# Patient Record
Sex: FEMALE | Race: BLACK OR AFRICAN AMERICAN | NOT HISPANIC OR LATINO
[De-identification: names, ages, dates, MRNs, and addresses within clinical notes are randomized per-mention and may not be internally consistent; named-entity substitution may affect disease eponyms.]

---

## 2023-04-20 ENCOUNTER — NON-APPOINTMENT (OUTPATIENT)
Age: 78
End: 2023-04-20

## 2023-04-20 ENCOUNTER — APPOINTMENT (OUTPATIENT)
Dept: OPHTHALMOLOGY | Facility: CLINIC | Age: 78
End: 2023-04-20
Payer: MEDICARE

## 2023-04-20 PROCEDURE — 92004 COMPRE OPH EXAM NEW PT 1/>: CPT

## 2023-04-20 PROCEDURE — 92015 DETERMINE REFRACTIVE STATE: CPT

## 2023-04-20 PROCEDURE — 92020 GONIOSCOPY: CPT

## 2023-05-09 ENCOUNTER — NON-APPOINTMENT (OUTPATIENT)
Age: 78
End: 2023-05-09

## 2023-05-09 ENCOUNTER — APPOINTMENT (OUTPATIENT)
Dept: OPHTHALMOLOGY | Facility: CLINIC | Age: 78
End: 2023-05-09
Payer: MEDICARE

## 2023-05-09 PROCEDURE — 99214 OFFICE O/P EST MOD 30 MIN: CPT

## 2023-05-09 PROCEDURE — 76514 ECHO EXAM OF EYE THICKNESS: CPT

## 2023-05-09 PROCEDURE — 92020 GONIOSCOPY: CPT

## 2023-05-09 PROCEDURE — 76513 OPH US DX ANT SGM US UNI/BI: CPT | Mod: 50

## 2023-06-09 ENCOUNTER — APPOINTMENT (OUTPATIENT)
Dept: OPHTHALMOLOGY | Facility: CLINIC | Age: 78
End: 2023-06-09
Payer: MEDICARE

## 2023-06-09 ENCOUNTER — NON-APPOINTMENT (OUTPATIENT)
Age: 78
End: 2023-06-09

## 2023-06-09 PROCEDURE — 66761 REVISION OF IRIS: CPT | Mod: RT

## 2023-07-10 ENCOUNTER — NON-APPOINTMENT (OUTPATIENT)
Age: 78
End: 2023-07-10

## 2023-07-10 ENCOUNTER — APPOINTMENT (OUTPATIENT)
Dept: OPHTHALMOLOGY | Facility: CLINIC | Age: 78
End: 2023-07-10
Payer: MEDICARE

## 2023-07-10 PROBLEM — Z00.00 ENCOUNTER FOR PREVENTIVE HEALTH EXAMINATION: Status: ACTIVE | Noted: 2023-07-10

## 2023-07-10 PROCEDURE — 66761 REVISION OF IRIS: CPT | Mod: LT

## 2023-08-09 ENCOUNTER — NON-APPOINTMENT (OUTPATIENT)
Age: 78
End: 2023-08-09

## 2023-08-09 ENCOUNTER — APPOINTMENT (OUTPATIENT)
Dept: OPHTHALMOLOGY | Facility: CLINIC | Age: 78
End: 2023-08-09
Payer: MEDICARE

## 2023-08-09 PROCEDURE — 92012 INTRM OPH EXAM EST PATIENT: CPT

## 2023-08-10 ENCOUNTER — TRANSCRIPTION ENCOUNTER (OUTPATIENT)
Age: 78
End: 2023-08-10

## 2023-09-27 ENCOUNTER — NON-APPOINTMENT (OUTPATIENT)
Age: 78
End: 2023-09-27

## 2023-09-27 ENCOUNTER — APPOINTMENT (OUTPATIENT)
Dept: OPHTHALMOLOGY | Facility: CLINIC | Age: 78
End: 2023-09-27
Payer: MEDICARE

## 2023-09-27 PROCEDURE — 92014 COMPRE OPH EXAM EST PT 1/>: CPT

## 2023-09-27 PROCEDURE — 92020 GONIOSCOPY: CPT

## 2024-05-22 ENCOUNTER — APPOINTMENT (OUTPATIENT)
Dept: ORTHOPEDIC SURGERY | Facility: CLINIC | Age: 79
End: 2024-05-22

## 2024-05-24 ENCOUNTER — EMERGENCY (EMERGENCY)
Facility: HOSPITAL | Age: 79
LOS: 1 days | Discharge: HOME | End: 2024-05-24
Attending: EMERGENCY MEDICINE | Admitting: EMERGENCY MEDICINE
Payer: MEDICARE

## 2024-05-24 VITALS
SYSTOLIC BLOOD PRESSURE: 105 MMHG | RESPIRATION RATE: 16 BRPM | HEART RATE: 71 BPM | DIASTOLIC BLOOD PRESSURE: 72 MMHG | OXYGEN SATURATION: 100 % | TEMPERATURE: 98 F

## 2024-05-24 VITALS
OXYGEN SATURATION: 97 % | WEIGHT: 158.07 LBS | HEART RATE: 99 BPM | TEMPERATURE: 97 F | SYSTOLIC BLOOD PRESSURE: 129 MMHG | RESPIRATION RATE: 18 BRPM | DIASTOLIC BLOOD PRESSURE: 88 MMHG

## 2024-05-24 PROCEDURE — 99284 EMERGENCY DEPT VISIT MOD MDM: CPT

## 2024-05-24 RX ORDER — DIAZEPAM 5 MG
1 TABLET ORAL
Qty: 12 | Refills: 0
Start: 2024-05-24 | End: 2024-05-26

## 2024-05-24 RX ORDER — DIAZEPAM 5 MG
5 TABLET ORAL ONCE
Refills: 0 | Status: DISCONTINUED | OUTPATIENT
Start: 2024-05-24 | End: 2024-05-24

## 2024-05-24 RX ORDER — LIDOCAINE 4 G/100G
1 CREAM TOPICAL ONCE
Refills: 0 | Status: COMPLETED | OUTPATIENT
Start: 2024-05-24 | End: 2024-05-24

## 2024-05-24 RX ORDER — KETOROLAC TROMETHAMINE 30 MG/ML
15 SYRINGE (ML) INJECTION ONCE
Refills: 0 | Status: DISCONTINUED | OUTPATIENT
Start: 2024-05-24 | End: 2024-05-24

## 2024-05-24 RX ADMIN — LIDOCAINE 1 PATCH: 4 CREAM TOPICAL at 10:09

## 2024-05-24 RX ADMIN — Medication 15 MILLIGRAM(S): at 10:09

## 2024-05-24 RX ADMIN — Medication 5 MILLIGRAM(S): at 10:09

## 2024-05-24 NOTE — ED ADULT NURSE NOTE - NSICDXPASTSURGICALHX_GEN_ALL_CORE_FT
PAST SURGICAL HISTORY:  Breast Cyst benign cyst removal, unsure either left or right as per patient

## 2024-05-24 NOTE — ED PROVIDER NOTE - PATIENT PORTAL LINK FT
You can access the FollowMyHealth Patient Portal offered by [facility] by registering at the following website: [email or web address]. By joining [facility]’s FollowMyHealth portal, you will also be able to view your health information using other applications (apps) compatible with our system.

## 2024-05-24 NOTE — ED PROVIDER NOTE - NEUROLOGICAL, MLM
Alert and oriented, no focal deficits, no motor or sensory deficits. Alert and oriented, no focal deficits,+r Straight leg sign

## 2024-05-24 NOTE — ED PROVIDER NOTE - NSFOLLOWUPINSTRUCTIONS_ED_ALL_ED_FT
Sciatica  Back view of a person showing a normal leg and a leg affected by the pain of sciatica.  Sciatica is pain, numbness, weakness, or tingling along the path of the sciatic nerve. The sciatic nerve starts in the lower back and runs down the back of each leg. The nerve controls the muscles in the lower leg and in the back of the knee. It also provides feeling (sensation) to the back of the thigh, the lower leg, and the sole of the foot. Sciatica is a symptom of another medical condition that pinches or puts pressure on the sciatic nerve.    Sciatica most often only affects one side of the body. Sciatica usually goes away on its own or with treatment. In some cases, sciatica may come back (recur).    What are the causes?  This condition is caused by pressure on the sciatic nerve or pinching of the nerve. This may be the result of:  A disk in between the bones of the spine bulging out too far (herniated disk).  Age-related changes in the spinal disks.  A pain disorder that affects a muscle in the buttock.  Extra bone growth near the sciatic nerve.  A break (fracture) of the pelvis.  Pregnancy.  Tumor. This is rare.  What increases the risk?  The following factors may make you more likely to develop this condition:  Playing sports that place pressure or stress on the spine.  Having poor strength and flexibility.  A history of back injury or surgery.  Sitting for long periods of time.  Doing activities that involve repetitive bending or lifting.  Obesity.  What are the signs or symptoms?  Symptoms can vary from mild to very severe. They may include:  Any of the following problems in the lower back, leg, hip, or buttock:  Mild tingling, numbness, or dull aches.  Burning sensations.  Sharp pains.  Numbness in the back of the calf or the sole of the foot.  Leg weakness.  Severe back pain that makes movement difficult.  Symptoms may get worse when you cough, sneeze, or laugh, or when you sit or stand for long periods of time.    How is this diagnosed?  This condition may be diagnosed based on:  Your symptoms and medical history.  A physical exam.  Blood tests.  Imaging tests, such as:  X-rays.  An MRI.  A CT scan.  How is this treated?  In many cases, this condition improves on its own without treatment. However, treatment may include:  Reducing or modifying physical activity.  Exercising, including strengthening and stretching.  Icing and applying heat to the affected area.  Medicines that help to:  Relieve pain and swelling.  Relax your muscles.  Injections of medicines that help to relieve pain and inflammation (steroids) around the sciatic nerve.  Surgery.  Follow these instructions at home:  Medicines    Take over-the-counter and prescription medicines only as told by your health care provider.  Ask your health care provider if the medicine prescribed to you requires you to avoid driving or using heavy machinery.  Managing pain    Bag of ice on a towel on the skin.  A heating pad for use on the affected area.  If directed, put ice on the affected area. To do this:  Put ice in a plastic bag.  Place a towel between your skin and the bag.  Leave the ice on for 20 minutes, 2–3 times a day.  If your skin turns bright red, remove the ice right away to prevent skin damage. The risk of skin damage is higher if you cannot feel pain, heat, or cold.  If directed, apply heat to the affected area as often as told by your health care provider. Use the heat source that your health care provider recommends, such as a moist heat pack or a heating pad.  Place a towel between your skin and the heat source.  Leave the heat on for 20–30 minutes.  If your skin turns bright red, remove the heat right away to prevent burns. The risk of burns is higher if you cannot feel pain, heat, or cold.  Activity    A comparison showing the right and wrong way to lift a heavy object.  Return to your normal activities as told by your health care provider. Ask your health care provider what activities are safe for you.  Avoid activities that make your symptoms worse.  Take brief periods of rest throughout the day.  When you rest for longer periods, mix in some mild activity or stretching between periods of rest. This will help to prevent stiffness and pain.  Avoid sitting for long periods of time without moving. Get up and move around at least one time each hour.  Exercise and stretch regularly as told by your health care provider.  Do not lift anything that is heavier than 10 lb (4.5 kg) until your health care provider says that it is safe. When you do not have symptoms, you should still avoid heavy lifting, especially repetitive heavy lifting.  When you lift objects, always use proper lifting technique, which includes:  Bending your knees.  Keeping the load close to your body.  Avoiding twisting.  General instructions    Maintain a healthy weight. Excess weight puts extra stress on your back.  Wear supportive, comfortable shoes. Avoid wearing high heels.  Avoid sleeping on a mattress that is too soft or too hard. A mattress that is firm enough to support your back when you sleep may help to reduce your pain.  Contact a health care provider if:  Your pain is not controlled by medicine.  Your pain does not improve or gets worse.  Your pain lasts longer than 4 weeks.  You have unexplained weight loss.  Get help right away if:  You are not able to control when you urinate or have bowel movements (incontinence).  You have:  Weakness in your lower back, pelvis, buttocks, or legs that gets worse.  Redness or swelling of your back.  A burning sensation when you urinate.  Summary  Sciatica is pain, numbness, weakness, or tingling along the path of the sciatic nerve, which may include the lower back, legs, hips, and buttocks.  This condition is caused by pressure on the sciatic nerve or pinching of the nerve.  Treatment often includes rest, exercise, medicines, and applying ice or heat.

## 2024-05-24 NOTE — ED ADULT NURSE NOTE - OBJECTIVE STATEMENT
A&Ox4. ambulatory. c/o  right sided back pain radiates down leg x 1 week. NAD. pt denies SOB, chest pain, dizziness, weakness, urinary symptoms, HA, n/v/d, fevers, chills, trauma, loss of bowels. respirations are even and un labored. skin intact. medicated as ordered. see EMAR.

## 2024-05-24 NOTE — ED ADULT TRIAGE NOTE - CHIEF COMPLAINT QUOTE
ambulatory to triage, c/o right sided back pain radiates down leg x 1 week, history of HTN Spontaneous, unlabored and symmetrical

## 2024-05-24 NOTE — ED PROVIDER NOTE - OBJECTIVE STATEMENT
78-year-old female history of hypertension presents emergency room with right mid gluteal pain rating down towards right posterior gluteal. 78-year-old female history of hypertension presents emergency room with right mid gluteal pain rating down towards right posterior gluteal. no urinary incontinence, no fecal incontinence.

## 2024-05-28 ENCOUNTER — APPOINTMENT (OUTPATIENT)
Age: 79
End: 2024-05-28
Payer: MEDICARE

## 2024-05-28 VITALS
HEART RATE: 93 BPM | SYSTOLIC BLOOD PRESSURE: 130 MMHG | WEIGHT: 158 LBS | BODY MASS INDEX: 25.39 KG/M2 | HEIGHT: 66 IN | DIASTOLIC BLOOD PRESSURE: 77 MMHG

## 2024-05-28 PROCEDURE — 72100 X-RAY EXAM L-S SPINE 2/3 VWS: CPT

## 2024-05-28 PROCEDURE — 99204 OFFICE O/P NEW MOD 45 MIN: CPT

## 2024-05-28 RX ORDER — DICLOFENAC SODIUM 75 MG/1
75 TABLET, DELAYED RELEASE ORAL
Qty: 60 | Refills: 0 | Status: ACTIVE | COMMUNITY
Start: 2024-05-28 | End: 1900-01-01

## 2024-05-28 NOTE — DISCUSSION/SUMMARY
[Medication Risks Reviewed] : Medication risks reviewed [de-identified] : She will rest and use moist heat.  She has been started on diclofenac 75 mg twice a day as a nonsteroidal anti-inflammatory.  She will call if there are problems with the medication or worsening of her symptoms and I will see her for follow-up in 3 weeks.

## 2024-05-28 NOTE — REASON FOR VISIT
[Initial Visit] : an initial visit for [Degenerative Joint Disease] : degenerative joint disease [Radiculopathy] : radiculopathy [Spouse] : spouse

## 2024-05-28 NOTE — PHYSICAL EXAM
[de-identified] : She is fully alert and oriented with a normal mood and affect.  She is uncomfortable sitting and prefers to be lying on her stomach.  She ambulates with a normal gait including tiptoe and heel walking.  There are no antalgic or coxalgia components to her gait.  There are no cutaneous abnormalities or palpable bony defects of the spine.  There is no evidence of shortness of breath or respiratory distress.  There is no paravertebral muscle spasm.  Sidebending is extremely good for her age.  There is no sciatic or trochanteric tenderness.  Her lower extremity neurological examination revealed trace symmetrical knee jerks with reinforcement.  Ankle jerks are absent.  Motor power is normal to manual testing all lower extremity groups and sensation is normal to light touch in all dermatomes.  Straight leg raising is negative to 90 degrees in the sitting position bilaterally.  Her hips and her knees have a full and painless range of motion with normal stability.  Vascular examination shows no evidence of significant varicosities and there is no lymphedema.  There are no cutaneous abnormalities of the upper or the lower extremities. [de-identified] : AP and lateral x-rays of the lumbar spine are obtained.  There are changes of facet arthrosis in the lower lumbar spine.  There is a very minimal degenerative spondylolisthesis at L4-5.  There are no destructive changes.

## 2024-05-28 NOTE — HISTORY OF PRESENT ILLNESS
[de-identified] : This 78-year-old woman who denies any prior history of spine related symptoms is seen in the office today along with her .  2 months ago she began with some discomfort in the right buttock.  At that point she was doing yoga and Pilates.  2 weeks ago she developed pain in the right buttock and lateral thigh extending to the calf and foot.  She is not having back pain or left leg pain.  She is having some numbness and tingling in the right leg but denies weakness.  There is no Valsalva effect.  She gets occasional night pain.  The pain is worse sitting and to a slightly lesser extent worse standing but no worse walking.  Treatment to date has been Tylenol, Valium and a muscle relaxant was prescribed.  She is in excellent health and only on amlodipine for hypertension.  She is a former smoker. [Pain Location] : pain [Worsening] : worsening [9] : a maximum pain level of 9/10 [Sitting] : sitting [Standing] : standing

## 2024-06-18 ENCOUNTER — APPOINTMENT (OUTPATIENT)
Dept: ORTHOPEDIC SURGERY | Facility: CLINIC | Age: 79
End: 2024-06-18
Payer: MEDICARE

## 2024-06-18 VITALS — WEIGHT: 158 LBS | BODY MASS INDEX: 25.39 KG/M2 | HEIGHT: 66 IN

## 2024-06-18 DIAGNOSIS — M47.816 SPONDYLOSIS W/OUT MYELOPATHY OR RADICULOPATHY, LUMBAR REGION: ICD-10-CM

## 2024-06-18 DIAGNOSIS — M54.16 RADICULOPATHY, LUMBAR REGION: ICD-10-CM

## 2024-06-18 PROCEDURE — 99213 OFFICE O/P EST LOW 20 MIN: CPT

## 2024-06-18 RX ORDER — DICLOFENAC SODIUM 50 MG/1
50 TABLET, DELAYED RELEASE ORAL
Qty: 90 | Refills: 0 | Status: ACTIVE | COMMUNITY
Start: 2024-06-18 | End: 1900-01-01

## 2024-06-18 NOTE — REASON FOR VISIT
[Follow-Up Visit] : a follow-up visit for [Degenerative Joint Disease] : degenerative joint disease [Radiculopathy] : radiculopathy [Spouse] : spouse

## 2024-06-18 NOTE — DISCUSSION/SUMMARY
[Medication Risks Reviewed] : Medication risks reviewed [de-identified] : She will finish the diclofenac 75 mg twice a day.  I have prescribed 50 mg which she should take 3 times a day until she has no ache or pain, only a discomfort or less and will then lower the dose to 50 mg twice a day.  She will call if there are problems with the medication or worsening of her symptoms and I will see her for follow-up in 4 weeks.

## 2024-06-18 NOTE — PHYSICAL EXAM
[de-identified] : She is comfortable sitting today and straight leg raising is negative to 90 degrees bilaterally.

## 2024-06-18 NOTE — HISTORY OF PRESENT ILLNESS
[de-identified] : She returns for follow-up of her symptoms of right-sided lumbar radiculopathy.  She was seen 3 weeks ago and the pain was severe and constant.  She has been on diclofenac 75 mg twice a day which she is tolerated.  The symptoms are now intermittent and vary between a discomfort and ache or a very mild pain.

## 2024-07-03 ENCOUNTER — NON-APPOINTMENT (OUTPATIENT)
Age: 79
End: 2024-07-03

## 2024-07-03 ENCOUNTER — APPOINTMENT (OUTPATIENT)
Dept: OPHTHALMOLOGY | Facility: CLINIC | Age: 79
End: 2024-07-03
Payer: MEDICARE

## 2024-07-03 PROCEDURE — 92014 COMPRE OPH EXAM EST PT 1/>: CPT

## 2024-07-03 PROCEDURE — 92020 GONIOSCOPY: CPT

## 2024-07-15 ENCOUNTER — NON-APPOINTMENT (OUTPATIENT)
Age: 79
End: 2024-07-15

## 2024-07-16 ENCOUNTER — APPOINTMENT (OUTPATIENT)
Dept: ORTHOPEDIC SURGERY | Facility: CLINIC | Age: 79
End: 2024-07-16
Payer: MEDICARE

## 2024-07-16 DIAGNOSIS — M54.16 RADICULOPATHY, LUMBAR REGION: ICD-10-CM

## 2024-07-16 DIAGNOSIS — M47.816 SPONDYLOSIS W/OUT MYELOPATHY OR RADICULOPATHY, LUMBAR REGION: ICD-10-CM

## 2024-07-16 PROCEDURE — 99213 OFFICE O/P EST LOW 20 MIN: CPT

## 2024-07-16 RX ORDER — DICLOFENAC SODIUM 50 MG/1
50 TABLET, DELAYED RELEASE ORAL
Qty: 40 | Refills: 0 | Status: ACTIVE | COMMUNITY
Start: 2024-07-16 | End: 1900-01-01

## 2024-07-16 RX ORDER — PREDNISONE 20 MG/1
20 TABLET ORAL
Qty: 26 | Refills: 0 | Status: ACTIVE | COMMUNITY
Start: 2024-07-16 | End: 1900-01-01

## 2024-08-13 ENCOUNTER — APPOINTMENT (OUTPATIENT)
Dept: ORTHOPEDIC SURGERY | Facility: CLINIC | Age: 79
End: 2024-08-13
Payer: MEDICARE

## 2024-08-13 DIAGNOSIS — M54.16 RADICULOPATHY, LUMBAR REGION: ICD-10-CM

## 2024-08-13 DIAGNOSIS — M47.816 SPONDYLOSIS W/OUT MYELOPATHY OR RADICULOPATHY, LUMBAR REGION: ICD-10-CM

## 2024-08-13 PROCEDURE — 99213 OFFICE O/P EST LOW 20 MIN: CPT

## 2024-08-13 NOTE — HISTORY OF PRESENT ILLNESS
[de-identified] : She returns for follow-up of her right leg symptoms.  She tolerated the prednisone and at this point has only some occasional discomfort. [Pain Location] : pain [Improving] : improving [0] : a minimum pain level of 0/10 [2] : a maximum pain level of 2/10 [Intermit.] : ~He/She~ states the symptoms seem to be intermittent

## 2024-08-13 NOTE — PHYSICAL EXAM
[de-identified] : She is comfortable sitting today and straight leg raising is negative to 90 degrees.

## 2024-08-13 NOTE — DISCUSSION/SUMMARY
[Medication Risks Reviewed] : Medication risks reviewed [de-identified] : She is just finished the diclofenac.  She will switch to 2 Aleve twice a day for 2 weeks and if doing well we will lower the dose to 1 twice a day for another 2 weeks before trying to discontinue anti-inflammatory medication altogether.  When she is doing well for 7 or 10 days off all medicine she can consider gradual return to her activities of yoga and Pilates which she felt started the symptoms initially.  I will see her for follow-up on a as needed basis.

## 2025-07-07 ENCOUNTER — NON-APPOINTMENT (OUTPATIENT)
Age: 80
End: 2025-07-07

## 2025-07-09 ENCOUNTER — NON-APPOINTMENT (OUTPATIENT)
Age: 80
End: 2025-07-09

## 2025-07-09 ENCOUNTER — APPOINTMENT (OUTPATIENT)
Dept: OPHTHALMOLOGY | Facility: CLINIC | Age: 80
End: 2025-07-09
Payer: MEDICARE

## 2025-07-09 PROCEDURE — 92014 COMPRE OPH EXAM EST PT 1/>: CPT

## 2025-07-09 PROCEDURE — 92133 CPTRZD OPH DX IMG PST SGM ON: CPT
